# Patient Record
Sex: MALE | Race: WHITE | NOT HISPANIC OR LATINO | Employment: FULL TIME | URBAN - METROPOLITAN AREA
[De-identification: names, ages, dates, MRNs, and addresses within clinical notes are randomized per-mention and may not be internally consistent; named-entity substitution may affect disease eponyms.]

---

## 2023-08-07 LAB
ANION GAP IN SER/PLAS: 12 MMOL/L (ref 10–20)
CALCIUM (MG/DL) IN SER/PLAS: 9.5 MG/DL (ref 8.6–10.3)
CARBON DIOXIDE, TOTAL (MMOL/L) IN SER/PLAS: 27 MMOL/L (ref 21–32)
CHLORIDE (MMOL/L) IN SER/PLAS: 102 MMOL/L (ref 98–107)
CHOLESTEROL (MG/DL) IN SER/PLAS: 277 MG/DL (ref 0–199)
CHOLESTEROL IN HDL (MG/DL) IN SER/PLAS: 48.2 MG/DL
CHOLESTEROL/HDL RATIO: 5.7
CREATININE (MG/DL) IN SER/PLAS: 1.12 MG/DL (ref 0.5–1.3)
GFR MALE: 77 ML/MIN/1.73M2
GLUCOSE (MG/DL) IN SER/PLAS: 81 MG/DL (ref 74–99)
LDL: 196 MG/DL (ref 0–99)
POTASSIUM (MMOL/L) IN SER/PLAS: 4 MMOL/L (ref 3.5–5.3)
PROSTATE SPECIFIC AG (NG/ML) IN SER/PLAS: 0.64 NG/ML (ref 0–4)
SODIUM (MMOL/L) IN SER/PLAS: 137 MMOL/L (ref 136–145)
TRIGLYCERIDE (MG/DL) IN SER/PLAS: 164 MG/DL (ref 0–149)
UREA NITROGEN (MG/DL) IN SER/PLAS: 19 MG/DL (ref 6–23)
VLDL: 33 MG/DL (ref 0–40)

## 2023-10-03 ENCOUNTER — HOSPITAL ENCOUNTER (OUTPATIENT)
Dept: RADIOLOGY | Facility: HOSPITAL | Age: 58
Discharge: HOME | End: 2023-10-03
Payer: COMMERCIAL

## 2023-10-03 DIAGNOSIS — Z13.6 ENCOUNTER FOR SCREENING FOR CARDIOVASCULAR DISORDERS: ICD-10-CM

## 2023-10-03 PROCEDURE — 75571 CT HRT W/O DYE W/CA TEST: CPT

## 2024-01-17 ENCOUNTER — OFFICE VISIT (OUTPATIENT)
Dept: ORTHOPEDIC SURGERY | Facility: CLINIC | Age: 59
End: 2024-01-17
Payer: COMMERCIAL

## 2024-01-17 DIAGNOSIS — D21.12: Primary | ICD-10-CM

## 2024-01-17 PROCEDURE — 99203 OFFICE O/P NEW LOW 30 MIN: CPT | Performed by: ORTHOPAEDIC SURGERY

## 2024-01-17 RX ORDER — BUPIVACAINE HYDROCHLORIDE 5 MG/ML
10 INJECTION, SOLUTION EPIDURAL; INTRACAUDAL ONCE
Status: CANCELLED | OUTPATIENT
Start: 2024-01-17 | End: 2024-01-17

## 2024-01-17 NOTE — PROGRESS NOTES
Subjective    Patient ID: Levi Blue is a 58 y.o. male.    Chief Complaint: OTHER (LT HAND MIDDLE FINGER CYST FOR A FEW MONTHS./NKI.)     Last Surgery: No surgery found  Last Surgery Date: No surgery found    HPI  Patient is a 58-year-old left-hand-dominant male who comes in with at least a 3-month history of a soft tissue mass on his left middle finger.  It has slowly enlarged in size.  At times it will interfere with his activities of daily living.  He does not recall any trauma.  He denies any numbness and paresthesias.    Objective   Ortho Exam  Patient is otherwise healthy male in no acute distress.  Exam of his left hand and wrist reveals his skin envelope is intact.  He has full range of motion in his fingers.  On the volar aspect of his left middle finger just proximal to the PIP flexion crease is a soft tissue mass approximately 1.1 cm in diameter.  It is soft and mobile.  There is no warmth erythema.  It is not pulsatile.    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  We discussed treatment options for it.  He wished to have it surgically excised.  I discussed with him in detail the risk, benefits alternatives of a left middle finger soft tissue mass excision.  The patient voiced understanding and informed consent was obtained.  The patient will call to schedule surgery.

## 2024-01-18 PROBLEM — D21.12: Status: ACTIVE | Noted: 2024-01-17

## 2024-02-02 ENCOUNTER — HOSPITAL ENCOUNTER (OUTPATIENT)
Facility: HOSPITAL | Age: 59
Setting detail: OUTPATIENT SURGERY
Discharge: HOME | End: 2024-02-02
Attending: ORTHOPAEDIC SURGERY | Admitting: ORTHOPAEDIC SURGERY
Payer: COMMERCIAL

## 2024-02-02 VITALS
WEIGHT: 187.39 LBS | SYSTOLIC BLOOD PRESSURE: 145 MMHG | HEIGHT: 70 IN | TEMPERATURE: 97.3 F | BODY MASS INDEX: 26.83 KG/M2 | OXYGEN SATURATION: 98 % | DIASTOLIC BLOOD PRESSURE: 85 MMHG | RESPIRATION RATE: 16 BRPM | HEART RATE: 69 BPM

## 2024-02-02 DIAGNOSIS — D21.12: ICD-10-CM

## 2024-02-02 PROCEDURE — 2500000005 HC RX 250 GENERAL PHARMACY W/O HCPCS: Performed by: ORTHOPAEDIC SURGERY

## 2024-02-02 PROCEDURE — 26115 EXC HAND LES SC < 1.5 CM: CPT | Performed by: ORTHOPAEDIC SURGERY

## 2024-02-02 PROCEDURE — 7100000009 HC PHASE TWO TIME - INITIAL BASE CHARGE: Performed by: ORTHOPAEDIC SURGERY

## 2024-02-02 PROCEDURE — 3600000003 HC OR TIME - INITIAL BASE CHARGE - PROCEDURE LEVEL THREE: Performed by: ORTHOPAEDIC SURGERY

## 2024-02-02 PROCEDURE — 88304 TISSUE EXAM BY PATHOLOGIST: CPT | Performed by: STUDENT IN AN ORGANIZED HEALTH CARE EDUCATION/TRAINING PROGRAM

## 2024-02-02 PROCEDURE — 7100000010 HC PHASE TWO TIME - EACH INCREMENTAL 1 MINUTE: Performed by: ORTHOPAEDIC SURGERY

## 2024-02-02 PROCEDURE — 3600000008 HC OR TIME - EACH INCREMENTAL 1 MINUTE - PROCEDURE LEVEL THREE: Performed by: ORTHOPAEDIC SURGERY

## 2024-02-02 PROCEDURE — 88304 TISSUE EXAM BY PATHOLOGIST: CPT | Mod: TC,SUR,PARLAB | Performed by: ORTHOPAEDIC SURGERY

## 2024-02-02 PROCEDURE — 2720000007 HC OR 272 NO HCPCS: Performed by: ORTHOPAEDIC SURGERY

## 2024-02-02 RX ORDER — ZOLPIDEM TARTRATE 10 MG/1
10 TABLET ORAL NIGHTLY PRN
COMMUNITY

## 2024-02-02 RX ORDER — BUPIVACAINE HYDROCHLORIDE 5 MG/ML
INJECTION, SOLUTION PERINEURAL AS NEEDED
Status: DISCONTINUED | OUTPATIENT
Start: 2024-02-02 | End: 2024-02-02 | Stop reason: HOSPADM

## 2024-02-02 ASSESSMENT — PAIN - FUNCTIONAL ASSESSMENT
PAIN_FUNCTIONAL_ASSESSMENT: 0-10
PAIN_FUNCTIONAL_ASSESSMENT: 0-10

## 2024-02-02 ASSESSMENT — PAIN SCALES - GENERAL
PAINLEVEL_OUTOF10: 0 - NO PAIN
PAINLEVEL_OUTOF10: 0 - NO PAIN

## 2024-02-02 ASSESSMENT — COLUMBIA-SUICIDE SEVERITY RATING SCALE - C-SSRS
2. HAVE YOU ACTUALLY HAD ANY THOUGHTS OF KILLING YOURSELF?: NO
6. HAVE YOU EVER DONE ANYTHING, STARTED TO DO ANYTHING, OR PREPARED TO DO ANYTHING TO END YOUR LIFE?: NO
1. IN THE PAST MONTH, HAVE YOU WISHED YOU WERE DEAD OR WISHED YOU COULD GO TO SLEEP AND NOT WAKE UP?: NO

## 2024-02-02 NOTE — BRIEF OP NOTE
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  Anesthesia Staff: No anesthesia staff entered.  Estimated Blood Loss: 1 mL  Intra-op Medications: Administrations occurring from 1100 to 1130 on 24:  * No intraprocedure medications in log *      Intraprocedure I/O Totals       None           Specimen:   ID Type Source Tests Collected by Time   1 : SOFT TISSUE MASS LEFT MIDDLE FINGER Tissue SOFT TISSUE MASS RESECTION SURGICAL PATHOLOGY EXAM Jn Plzaa MD 2024 1230        Staff:   Circulator: Laurie Guerra RN; Nanda Carney RN  Scrub Person: Kisha Arriaza RN          Findings: Soft tissue mass with clinical appearance of inclusion cyst    Complications:  None; patient tolerated the procedure well.     Disposition: PACU - hemodynamically stable.  Condition: stable  Specimens Collected:   ID Type Source Tests Collected by Time   1 : SOFT TISSUE MASS LEFT MIDDLE FINGER Tissue SOFT TISSUE MASS RESECTION SURGICAL PATHOLOGY EXAM Jn Plaza MD 2024 1230     Attending Attestation: I performed the procedure.    Jn Plaza  Phone Number: [phone]

## 2024-02-02 NOTE — OP NOTE
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  Anesthesia Staff: No anesthesia staff entered.  Estimated Blood Loss: 1 mL  Intra-op Medications: Administrations occurring from 1100 to 1130 on 24:  * No intraprocedure medications in log *      Intraprocedure I/O Totals       None           Specimen:   ID Type Source Tests Collected by Time   1 : SOFT TISSUE MASS LEFT MIDDLE FINGER Tissue SOFT TISSUE MASS RESECTION SURGICAL PATHOLOGY EXAM Jn Plaza MD 2024 1230        Staff:   Circulator: Laurie Guerra RN; Nanda Carney RN  Scrub Person: Kisha Arriaza RN         Drains and/or Catheters: * None in log *    Tourniquet Times:     Total Tourniquet Time Documented:  Arm - Lower (Left) - 7 minutes  Total: Arm - Lower (Left) - 7 minutes      Implants:     Findings: Soft tissue mass with clinical appearance of inclusion cyst    Indications: Levi Blue is an 58 y.o. 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  Patient had presented with a slowly enlarging mass in the volar aspect of his left middle finger.  It was beginning to interfere with activities given its size.  He wished to have it surgically excised.  I explained to the patient the risk, benefits alternatives of a left middle finger soft tissue mass excision.  I explained to the  patient that the risks of the procedure include but are not limited to infection, recurrence of the mass, postoperative pain and stiffness, damage to nerves and blood vessels, as well as risks associate with anesthesia.  The patient voiced understanding and informed consent was obtained.    The patient was seen in the preoperative area. The risks, benefits, complications, treatment options, non-operative alternatives, expected recovery and outcomes were discussed with the patient. The possibilities of reaction to medication, pulmonary aspiration, injury to surrounding structures, bleeding, recurrent infection, the need for additional procedures, failure to diagnose a condition, and creating a complication requiring transfusion or operation were discussed with the patient. The patient concurred with the proposed plan, giving informed consent.  The site of surgery was properly noted/marked if necessary per policy. The patient has been actively warmed in preoperative area. Preoperative antibiotics are not indicated. Venous thrombosis prophylaxis are not indicated.    Procedure Details: The patient was prepped identified in the preoperative waiting area and his left middle finger was marked as site of surgery.  Patient was taken back to the operating room suite placed supine on the OR table.  A nonsterile tourniquet was applied to patient's left forearm.  A preoperative verification timeout was taken.  At this point I injected 10 mL of half percent plain Marcaine as a digital block to the patient's left middle finger.  Patient's left upper extremity was prepped and draped sterile fashion.  Patient's left upper extremity was exsanguinated with an Esmarch bandage and a tourniquet inflated to 250 mmHg. at this point approximately 1.5 cm oblique incision was made overlying the volar aspect of the proximal phalanx of the left middle finger.  Sharp dissection was carried through skin and subcutaneous tissue.  A soft tissue  mass about 1.2 cm in diameter was noted along the radial aspect of the distal proximal phalanx.  This was able to be excised in toto.  It had the clinical appearance of a inclusion cyst.  Care was taken to protect the neurovascular bundles.  Electrocautery was used to achieve hemostasis.  Wound was irrigated with saline.  Skin was closed with 4-0 nylon suture.  Tourniquet was let down after 7 minutes.  Good vascular return was seen to the patient's left hand and all digits.  A sterile bandage consisting of Xeroform, gauze 4 x 4's, Webril and Ace wrap was applied to the patient's left hand.  Patient was brought back to recovery room in stable condition.  There were no complications during the case.  All sponge and needle counts were correct at the end of the case.  Complications:  None; patient tolerated the procedure well.    Disposition: PACU - hemodynamically stable.  Condition: stable         Additional Details: None    Attending Attestation: I performed the procedure.    Jn Plaza  Phone Number: [phone]

## 2024-02-02 NOTE — INTERVAL H&P NOTE
H&P reviewed. The patient was examined and there are no changes to the H&P.  
I have personally seen and examined the patient. I have collaborated with and supervised the

## 2024-02-09 LAB
LABORATORY COMMENT REPORT: NORMAL
PATH REPORT.FINAL DX SPEC: NORMAL
PATH REPORT.GROSS SPEC: NORMAL
PATH REPORT.RELEVANT HX SPEC: NORMAL
PATH REPORT.TOTAL CANCER: NORMAL
RESIDENT REVIEW: NORMAL

## 2024-02-14 ENCOUNTER — OFFICE VISIT (OUTPATIENT)
Dept: ORTHOPEDIC SURGERY | Facility: CLINIC | Age: 59
End: 2024-02-14
Payer: COMMERCIAL

## 2024-02-14 VITALS — BODY MASS INDEX: 26.77 KG/M2 | HEIGHT: 70 IN | WEIGHT: 187 LBS

## 2024-02-14 DIAGNOSIS — D21.12: Primary | ICD-10-CM

## 2024-02-14 PROCEDURE — 99024 POSTOP FOLLOW-UP VISIT: CPT | Performed by: ORTHOPAEDIC SURGERY

## 2024-02-14 PROCEDURE — 1036F TOBACCO NON-USER: CPT | Performed by: ORTHOPAEDIC SURGERY

## 2024-02-14 NOTE — PROGRESS NOTES
Subjective    Patient ID: Levi Blue is a 58 y.o. male.    Chief Complaint: OTHER (POST OP CHECK LT MIDDLE FINGER SOFT TISSUE MASS REMOVAL/DOS: 2/2/24/)     Last Surgery: Left Middle Finger A-1 Pulley Release - Left  Last Surgery Date: 2/2/2024    HPI  Patient comes in first postoperative visit after undergoing a left middle finger inclusion cyst excision.  He has had very little pain since surgery.  He has been using his left hand without any difficulty.    Objective   Ortho Exam  Patient is in no acute distress.  Exam of his left middle finger reveals the incision is healed well.  There is no evidence of infection.  Sutures were removed.  He has full range of motion in his left middle finger.    Pathology was reviewed with the patient which was positive for an epidermal inclusion cyst.        Assessment/Plan   Encounter Diagnoses:  Left hand benign tumor    Patient has done satisfactory following excision of an inclusion cyst from his left middle finger.  He may use his left hand is much as he can tolerate.  He will follow-up as his symptoms dictate.

## (undated) DEVICE — Device

## (undated) DEVICE — DRESSING, GAUZE, PETROLATUM, PATCH, XEROFORM, 1 X 8 IN, STERILE

## (undated) DEVICE — CUFF, TOURNIQUET, 18 X 4, DUAL PORT/SNGL BLADDER, DISP, LF

## (undated) DEVICE — BANDAGE, ELASTIC, PREMIUM, SELF-CLOSE, 4 IN X 5.5 YD, STERILE